# Patient Record
Sex: MALE | Race: WHITE
[De-identification: names, ages, dates, MRNs, and addresses within clinical notes are randomized per-mention and may not be internally consistent; named-entity substitution may affect disease eponyms.]

---

## 2019-03-08 ENCOUNTER — HOSPITAL ENCOUNTER (EMERGENCY)
Dept: HOSPITAL 91 - FTE | Age: 54
Discharge: HOME | End: 2019-03-08
Payer: SELF-PAY

## 2019-03-08 ENCOUNTER — HOSPITAL ENCOUNTER (EMERGENCY)
Dept: HOSPITAL 10 - FTE | Age: 54
Discharge: HOME | End: 2019-03-08
Payer: SELF-PAY

## 2019-03-08 VITALS
BODY MASS INDEX: 34.19 KG/M2 | BODY MASS INDEX: 34.19 KG/M2 | WEIGHT: 174.17 LBS | HEIGHT: 60 IN | WEIGHT: 174.17 LBS | HEIGHT: 60 IN

## 2019-03-08 VITALS — HEART RATE: 92 BPM | RESPIRATION RATE: 18 BRPM | DIASTOLIC BLOOD PRESSURE: 94 MMHG | SYSTOLIC BLOOD PRESSURE: 158 MMHG

## 2019-03-08 DIAGNOSIS — M54.2: Primary | ICD-10-CM

## 2019-03-08 DIAGNOSIS — M24.511: ICD-10-CM

## 2019-03-08 DIAGNOSIS — M54.9: ICD-10-CM

## 2019-03-08 PROCEDURE — 99282 EMERGENCY DEPT VISIT SF MDM: CPT

## 2019-03-08 RX ADMIN — IBUPROFEN 1 MG: 600 TABLET ORAL at 11:52
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
